# Patient Record
(demographics unavailable — no encounter records)

---

## 2024-10-30 NOTE — PHYSICAL EXAM
[Alert] : alert [Well Nourished] : well nourished [No Acute Distress] : no acute distress [Well Developed] : well developed [Normal Sclera/Conjunctiva] : normal sclera/conjunctiva [EOMI] : extra ocular movement intact [No Proptosis] : no proptosis [Normal Oropharynx] : the oropharynx was normal [Thyroid Not Enlarged] : the thyroid was not enlarged [No Thyroid Nodules] : no palpable thyroid nodules [No Respiratory Distress] : no respiratory distress [No Accessory Muscle Use] : no accessory muscle use [Clear to Auscultation] : lungs were clear to auscultation bilaterally [Normal S1, S2] : normal S1 and S2 [Normal Rate] : heart rate was normal [Regular Rhythm] : with a regular rhythm [No Edema] : no peripheral edema [Pedal Pulses Normal] : the pedal pulses are present [Normal Bowel Sounds] : normal bowel sounds [Not Tender] : non-tender [Not Distended] : not distended [Soft] : abdomen soft [Normal Anterior Cervical Nodes] : no anterior cervical lymphadenopathy [Normal Posterior Cervical Nodes] : no posterior cervical lymphadenopathy [No Spinal Tenderness] : no spinal tenderness [Spine Straight] : spine straight [Normal Gait] : normal gait [Normal Strength/Tone] : muscle strength and tone were normal [No Rash] : no rash [Acanthosis Nigricans] : acanthosis nigricans present [Right foot was examined, including] : right foot ~C was examined, including visual inspection with sensory and pulse exams [Left foot was examined, including] : left foot ~C was examined, including visual inspection with sensory and pulse exams [Normal] : normal [Full ROM] : with full range of motion [No Tremors] : no tremors [Oriented x3] : oriented to person, place, and time [Normal Affect] : the affect was normal [Normal Insight/Judgement] : insight and judgment were intact [Acne] : no acne [Diminished Throughout Both Feet] : normal tactile sensation with monofilament testing throughout both feet [de-identified] : cushingoid appearance, +dorsocervical fullness, acanthosis

## 2024-10-30 NOTE — HISTORY OF PRESENT ILLNESS
[FreeTextEntry1] : 36 yo F PMH of arthralgias without a defined aiCTD, steroid induced diabetes, elevated iGE, thyroid nodules here for endocrine care.  Fam hx of DM2 in parents. Had GDM at age 32, reports PCOS. Took metformin in high school.  Needed to be on insulin in pregnancy. Hx of preeclampsia.   A1c 5.9% today highest 6.8 on steroids in 11/2022  Not on any meds Metformin caused GI upset in high school. Prefers not to be on meds if possible.  Needs to see ophtho   Not checking sugars   Prednisone exposure for several years, not daily. Starting mrethlpred dose pack this week with meloxicam, does not know the dose. Seeing rheum and eNT  Was trying to walk 1 hour daily, 10k steps but dealing with back issues so exercise limited. Wearing a back brace now.  24 hr diet recall not hungry when she wakes up first meal at 2 pm - will eat larger portions, drink juice  8pm will have another meal but once she is starving and will binge   menstrual hx menarche: 10 yo , regular Has had hx of skipping menses; will get every 2-3 months previously  Took a while to get pregnant each time No ovulation med With exercise has been more regular last 4 months got menses monthly LMP 10/24/24 Potentially thinking about another pregnancy in the future  Seeing GYN Acne and hair thinning Not on OCP +skin tags   thyroid nodules  3/2023 US There is a 1 cm colloid cyst at the right upper pole. There is a 1.5 cm heterogeneous right midpole nodule. There is 6 mm colloid cyst right lower pole. A 1.6 cm complex cystic left upper pole nodule. A 8mm isoechoic left lower pole nodule. A 9 mm isoechoic left midpole nodule. Multiple small cervical lymph nodes are again noted. There is a reference 1.4 x 0.8 x 1.5 cm left submandibular lymph node. A reference 1.9 x 1.1 x 1.4 cm right level 2 node.

## 2024-10-30 NOTE — HISTORY OF PRESENT ILLNESS
[FreeTextEntry1] : 38 yo F PMH of arthralgias without a defined aiCTD, steroid induced diabetes, elevated iGE, thyroid nodules here for endocrine care.  Fam hx of DM2 in parents. Had GDM at age 32, reports PCOS. Took metformin in high school.  Needed to be on insulin in pregnancy. Hx of preeclampsia.   A1c 5.9% today highest 6.8 on steroids in 11/2022  Not on any meds Metformin caused GI upset in high school. Prefers not to be on meds if possible.  Needs to see ophtho   Not checking sugars   Prednisone exposure for several years, not daily. Starting mrethlpred dose pack this week with meloxicam, does not know the dose. Seeing rheum and eNT  Was trying to walk 1 hour daily, 10k steps but dealing with back issues so exercise limited. Wearing a back brace now.  24 hr diet recall not hungry when she wakes up first meal at 2 pm - will eat larger portions, drink juice  8pm will have another meal but once she is starving and will binge   menstrual hx menarche: 10 yo , regular Has had hx of skipping menses; will get every 2-3 months previously  Took a while to get pregnant each time No ovulation med With exercise has been more regular last 4 months got menses monthly LMP 10/24/24 Potentially thinking about another pregnancy in the future  Seeing GYN Acne and hair thinning Not on OCP +skin tags   thyroid nodules  3/2023 US There is a 1 cm colloid cyst at the right upper pole. There is a 1.5 cm heterogeneous right midpole nodule. There is 6 mm colloid cyst right lower pole. A 1.6 cm complex cystic left upper pole nodule. A 8mm isoechoic left lower pole nodule. A 9 mm isoechoic left midpole nodule. Multiple small cervical lymph nodes are again noted. There is a reference 1.4 x 0.8 x 1.5 cm left submandibular lymph node. A reference 1.9 x 1.1 x 1.4 cm right level 2 node.

## 2024-10-30 NOTE — ASSESSMENT
[Diabetes Foot Care] : diabetes foot care [Long Term Vascular Complications] : long term vascular complications of diabetes [Carbohydrate Consistent Diet] : carbohydrate consistent diet [Importance of Diet and Exercise] : importance of diet and exercise to improve glycemic control, achieve weight loss and improve cardiovascular health [Exercise/Effect on Glucose] : exercise/effect on glucose [Retinopathy Screening] : Patient was referred to ophthalmology for retinopathy screening [Weight Loss] : weight loss [Diabetic Medications] : Risks and benefits of diabetic medications were discussed [FreeTextEntry1] : 38 yo F PMH of arthralgias without a defined aiCTD, steroid induced diabetes, elevated iGE, thyroid nodules here for endocrine care.  #Steroid induced Diabetes Mellitus: goal HbA1c is <7%. Today HbA1c is 5.9. Medication changes recommended:  - no meds per pt preference. Discussed metformin while on steroids also with PCOS history reported - no glp1 now if not on contraception/planning pregnancy - pt will work on lifestyle modifications - discussed risks of chronic steroid exposure and DM2  Labs ordered: o   check CMP, lipids, B12, UMCR yearly.  o   check HbA1c every 3 months  Education/Counseling done during this visit: o   SMBG testing guidelines o   Medications reviewed o   Signs/Symptoms/Treatment of hypoglycemia o   Encouraged 30 min exercise five days a week, portion control, carb consistent diet. o   Recommended yearly foot exams and home foot precautions. Monofilament exam performed on 10/2024 normal o   Recommended at least yearly ophthalmology exams or as recommended by ophtho o   Preconception counseling completed for this patient of child-bearing age. *Pt may be considering pregnancy in the next year. Discussed need for glucose control to reduce fetal and maternal risk. Advised she consider metformin now. Needed insulin in last pregnancy for GDM   #HTN: Goal BP <130/80  not on meds; hx of preeclampsia. Sent bp cuff. Check renin/april -discussed weight loss -UMCR: check   #HLD:  - check lipis -Discussed healthy dieting, increased exercise and weight loss.  #thyroid nodules - check thyroid US  #irregular menses, reported PCOS, has acanthosis, HTN discussed lifestyle modifications to lose weight, improve BMI, glycemic control and hypertriglyceridemia check androgens, tfts. Defer PRL with recent regular menses. Do not have original labs on file discussed metformin menses now regular with some weight loss. Not on OCP. Monitor menstrual cycles off ocp no spironolactone not on OCP can try ovasitol   #cushingdoid appearance, weight gain, acanthosis, HTN - pt with chronic intermittent steroid exposure so likely iatrogenic - starting steroids now - no signs of AI - consider assessing HPA axis at future visit if off steroids

## 2024-10-30 NOTE — PHYSICAL EXAM
[Alert] : alert [Well Nourished] : well nourished [No Acute Distress] : no acute distress [Well Developed] : well developed [Normal Sclera/Conjunctiva] : normal sclera/conjunctiva [EOMI] : extra ocular movement intact [No Proptosis] : no proptosis [Normal Oropharynx] : the oropharynx was normal [Thyroid Not Enlarged] : the thyroid was not enlarged [No Thyroid Nodules] : no palpable thyroid nodules [No Respiratory Distress] : no respiratory distress [No Accessory Muscle Use] : no accessory muscle use [Clear to Auscultation] : lungs were clear to auscultation bilaterally [Normal S1, S2] : normal S1 and S2 [Normal Rate] : heart rate was normal [Regular Rhythm] : with a regular rhythm [No Edema] : no peripheral edema [Pedal Pulses Normal] : the pedal pulses are present [Normal Bowel Sounds] : normal bowel sounds [Not Tender] : non-tender [Not Distended] : not distended [Soft] : abdomen soft [Normal Anterior Cervical Nodes] : no anterior cervical lymphadenopathy [Normal Posterior Cervical Nodes] : no posterior cervical lymphadenopathy [No Spinal Tenderness] : no spinal tenderness [Spine Straight] : spine straight [Normal Gait] : normal gait [Normal Strength/Tone] : muscle strength and tone were normal [No Rash] : no rash [Acanthosis Nigricans] : acanthosis nigricans present [Right foot was examined, including] : right foot ~C was examined, including visual inspection with sensory and pulse exams [Left foot was examined, including] : left foot ~C was examined, including visual inspection with sensory and pulse exams [Normal] : normal [Full ROM] : with full range of motion [No Tremors] : no tremors [Oriented x3] : oriented to person, place, and time [Normal Affect] : the affect was normal [Normal Insight/Judgement] : insight and judgment were intact [Acne] : no acne [Diminished Throughout Both Feet] : normal tactile sensation with monofilament testing throughout both feet [de-identified] : cushingoid appearance, +dorsocervical fullness, acanthosis

## 2024-10-30 NOTE — ASSESSMENT
[Diabetes Foot Care] : diabetes foot care [Long Term Vascular Complications] : long term vascular complications of diabetes [Carbohydrate Consistent Diet] : carbohydrate consistent diet [Importance of Diet and Exercise] : importance of diet and exercise to improve glycemic control, achieve weight loss and improve cardiovascular health [Exercise/Effect on Glucose] : exercise/effect on glucose [Retinopathy Screening] : Patient was referred to ophthalmology for retinopathy screening [Weight Loss] : weight loss [Diabetic Medications] : Risks and benefits of diabetic medications were discussed [FreeTextEntry1] : 36 yo F PMH of arthralgias without a defined aiCTD, steroid induced diabetes, elevated iGE, thyroid nodules here for endocrine care.  #Steroid induced Diabetes Mellitus: goal HbA1c is <7%. Today HbA1c is 5.9. Medication changes recommended:  - no meds per pt preference. Discussed metformin while on steroids also with PCOS history reported - no glp1 now if not on contraception/planning pregnancy - pt will work on lifestyle modifications - discussed risks of chronic steroid exposure and DM2  Labs ordered: o   check CMP, lipids, B12, UMCR yearly.  o   check HbA1c every 3 months  Education/Counseling done during this visit: o   SMBG testing guidelines o   Medications reviewed o   Signs/Symptoms/Treatment of hypoglycemia o   Encouraged 30 min exercise five days a week, portion control, carb consistent diet. o   Recommended yearly foot exams and home foot precautions. Monofilament exam performed on 10/2024 normal o   Recommended at least yearly ophthalmology exams or as recommended by ophtho o   Preconception counseling completed for this patient of child-bearing age. *Pt may be considering pregnancy in the next year. Discussed need for glucose control to reduce fetal and maternal risk. Advised she consider metformin now. Needed insulin in last pregnancy for GDM   #HTN: Goal BP <130/80  not on meds; hx of preeclampsia. Sent bp cuff. Check renin/april -discussed weight loss -UMCR: check   #HLD:  - check lipis -Discussed healthy dieting, increased exercise and weight loss.  #thyroid nodules - check thyroid US  #irregular menses, reported PCOS, has acanthosis, HTN discussed lifestyle modifications to lose weight, improve BMI, glycemic control and hypertriglyceridemia check androgens, tfts. Defer PRL with recent regular menses. Do not have original labs on file discussed metformin menses now regular with some weight loss. Not on OCP. Monitor menstrual cycles off ocp no spironolactone not on OCP can try ovasitol   #cushingdoid appearance, weight gain, acanthosis, HTN - pt with chronic intermittent steroid exposure so likely iatrogenic - starting steroids now - no signs of AI - consider assessing HPA axis at future visit if off steroids

## 2024-11-12 NOTE — REASON FOR VISIT
[Subsequent Evaluation] : a subsequent evaluation for [FreeTextEntry2] : s/p left tympanoplasty with a composite cartilage graft and microscope use 8/30/22

## 2024-11-12 NOTE — HISTORY OF PRESENT ILLNESS
[de-identified] : 37 year old woman here for left otorrhea hx of left tympanoplasty with a composite cartilage graft and microscope use 8/30/22.  Has had left otorrhea thats brown and wet, and sometimes bright red blood  Discomfort to the left ear Intermittent tinnitus Patient denies dizziness, vertigo, headaches related to hearing.

## 2024-11-12 NOTE — PHYSICAL EXAM
[Midline] : trachea located in midline position [Binocular Microscopic Exam] : Binocular microscopic exam was performed [Hearing Benavides Test (Tuning Fork On Forehead)] : no lateralization of tone [Normal] : gait was normal [de-identified] : large L lymphadenopathy, tender [Hearing Loss Right Only] : normal [Hearing Loss Left Only] : normal [Rinne Test Air Conduction Persists > Bone Conduction Right] : bone conduction greater than air conduction on the right [Rinne Test Air Conduction Persists > Bone Conduction Left] : bone conduction greater than air conduction on the left [Nystagmus] : ~T no ~M nystagmus was seen [Fukuda Step Test] : Fukuda Step Test was Negative [Romberg's Sign] : Romberg's sign was absent [Fistula Sign] : Fistula Sign: Negative [Past-Pointing] : Past-Pointing: Negative [Vinod-Hallkevinke] : Medina-Hallpike: Negative [FreeTextEntry9] : dry [de-identified] : small polyp, cauterized

## 2024-11-12 NOTE — PROCEDURE
[Same] : same as the Pre Op Dx. [] : Binocular Microscopy [FreeTextEntry1] : tympanoplasty  [FreeTextEntry4] : none [FreeTextEntry6] : Operative microscope was used to examine the ear canal, ear drum, and visible middle ear landmarks. Adequate exam would not have been possible without the use of a microscope. Findings are described.

## 2024-11-14 NOTE — ASSESSMENT
[FreeTextEntry1] : .  Ms. 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 no muscle weakness -- currently drinking 8 - 10 glasses of fluid daily -- myomarker September 2022 with borderline PM/Scl ab - no s/s of scl -- MRI thigh June 8 no muscle edema or atrophy - had some tendionsis -- will aquire test for non-inflammatory metabolic myopathies as this is the second episode of cramping and muscle pain associated with increased cpk. first time after anesthesia with full rhabdo and now second smaller episode. resolved without steroids.- d/w patient ta she will be getting the kit shortly -- CPK now normal -- can now do the metabolic myoapthy lab testing at  - will request send through [facility]  #UCTD extensive workup for LAD (see below) and elevated IgE, with negative serologies but elevated inflammatory markers. Had extensive AI and ID workup with several biopsies - all non-conclusive. steroid trial helped and currently has very little pain functions well -- d/w patient HCQ in place of steroids for polyarthralgia inc esr - declines for now r/b/a discussed -- just had steroids for the back - which seems unrelated - will check ESR. CRP to see if it improved on susan steroids  #LAD. cervical - level I/II and axillary. unclear etiology. s/p excision and as d/w pathologist not c/w IGG4, nor lymphoma. Heme/onc, ADALI an Rheum workup has at this point been negative. rheum workup at this point has been unrevealing and no s/s autoimmune etiology aside from non-specific elevation of inflammatory markers. workup thus far negative for aiCTD by serologies, sarcoid (PET, ACE not helpful), IGg4 ( neg bx), kikuchi (lacks the constitutional sxs) -- reviewed pathology with patient and the pathologist - negative -- reviewed heme onc evaluation - negative -- f/u at least annually  #elevated IgE unclear etiology as doesn't have features of other Hyper IgE syndromes.- out of proportion to h/o of asthma (which was active as a kid) - currently no lung, skin abscesses and normal eosinophils in the blood. normal eosinophils speaks against hyper IgE. no other signs of EGPA and ANCA negative, - r/o infection with inc percent eo and igE - was checked for cat scratch, tb etc - f/u Oppenheimer was negative -- f/u onc annually  #medication monitoring, long term use of drug -- will check labs in a month  # low vitamin D -- supplement to keep above 32   More than 50% of the encounter was spent counseling the patient on differential, workup, disease course and treatment/management. Education was provided to the patient during this encounter. All questions and concerns were addressed and answered. The patient verbalized understanding and agreed to the plan.  Patient has been instructed to call for an appointment if new symptoms develop. Patient has been instructed to make a followup appointment 2 - 3 months  Time spent on the encounter included, but is not limited to, preparing to see the patient, obtaining and/or reviewing separately obtained history, performing the evaluation, counseling and educating, independently interpreting results with communication to patient, order placement, referring and/or communicating with other health professionals as described, and documenting clinical information in the electronic health record.

## 2024-11-14 NOTE — ASSESSMENT
[FreeTextEntry1] : .  Ms. 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 no muscle weakness -- currently drinking 8 - 10 glasses of fluid daily -- myomarker September 2022 with borderline PM/Scl ab - no s/s of scl -- MRI thigh June 8 no muscle edema or atrophy - had some tendionsis -- will aquire test for non-inflammatory metabolic myopathies as this is the second episode of cramping and muscle pain associated with increased cpk. first time after anesthesia with full rhabdo and now second smaller episode. resolved without steroids.- d/w patient ta she will be getting the kit shortly -- CPK now normal -- can now do the metabolic myoapthy lab testing at  - will request send through [facility]  #UCTD extensive workup for LAD (see below) and elevated IgE, with negative serologies but elevated inflammatory markers. Had extensive AI and ID workup with several biopsies - all non-conclusive. steroid trial helped and currently has very little pain functions well -- d/w patient HCQ in place of steroids for polyarthralgia inc esr - declines for now r/b/a discussed -- just had steroids for the back - which seems unrelated - will check ESR. CRP to see if it improved on susan steroids  #LAD. cervical - level I/II and axillary. unclear etiology. s/p excision and as d/w pathologist not c/w IGG4, nor lymphoma. Heme/onc, ADALI an Rheum workup has at this point been negative. rheum workup at this point has been unrevealing and no s/s autoimmune etiology aside from non-specific elevation of inflammatory markers. workup thus far negative for aiCTD by serologies, sarcoid (PET, ACE not helpful), IGg4 ( neg bx), kikuchi (lacks the constitutional sxs) -- reviewed pathology with patient and the pathologist - negative -- reviewed heme onc evaluation - negative -- f/u at least annually  #elevated IgE unclear etiology as doesn't have features of other Hyper IgE syndromes.- out of proportion to h/o of asthma (which was active as a kid) - currently no lung, skin abscesses and normal eosinophils in the blood. normal eosinophils speaks against hyper IgE. no other signs of EGPA and ANCA negative, - r/o infection with inc percent eo and igE - was checked for cat scratch, tb etc - f/u Oppenheimer was negative -- f/u onc annually  #medication monitoring, long term use of drug -- will check labs in a month  # low vitamin D -- supplement to keep above 32   More than 50% of the encounter was spent counseling the patient on differential, workup, disease course and treatment/management. Education was provided to the patient during this encounter. All questions and concerns were addressed and answered. The patient verbalized understanding and agreed to the plan.  Patient has been instructed to call for an appointment if new symptoms develop. Patient has been instructed to make a followup appointment 2 - 3 months  Time spent on the encounter included, but is not limited to, preparing to see the patient, obtaining and/or reviewing separately obtained history, performing the evaluation, counseling and educating, independently interpreting results with communication to patient, order placement, referring and/or communicating with other health professionals as described, and documenting clinical information in the electronic health record.

## 2024-11-14 NOTE — HISTORY OF PRESENT ILLNESS
[FreeTextEntry1] :  Ms. Montilla has been seen in our office for persistent LAD, increased IgE and worsening arthralgias without a defined aiCTD    INTERVAL Hx  was doing quite well - never started the medication as felt great over the summer  hands are so much better than they were  howevere was developing more lower back pain - no radiation or neurologic challenges (no bowel or bladder changes)  no rash  has persistence of lymph nodes  was given soem steroids - and felt better in the back ----------------------------------------------------- presenting hx / background august 2020 - LAD without associated systemic complaints - no fevers, rash, night sweats, sick contacts.- workup including excisional bx negative except for markedly elevated IgE without clear etiology

## 2024-12-11 NOTE — PLAN
[FreeTextEntry1] : 37 year old  LMP 11/24/24 presents for annual gyn visit.  HPV/Pap done today RTO one year for annual

## 2024-12-11 NOTE — HISTORY OF PRESENT ILLNESS
[Patient reported mammogram was normal] : Patient reported mammogram was normal [Patient reported breast sonogram was normal] : Patient reported breast sonogram was normal [Patient reported PAP Smear was normal] : Patient reported PAP Smear was normal [No] : Patient does not have concerns regarding sex [Currently Active] : currently active [Men] : men [Condoms] : Condoms [FreeTextEntry1] : 37 year old  LMP 11/24/24 presents for annual gyn visit.  Pt feels well and has no complaints. She denies intermenstrual bleeding, itching, burning or abnormal discharge.  [Mammogramdate] : 01/24 [BreastSonogramDate] : 01/24 [PapSmeardate] : 10/22 [FreeTextEntry2] : one partner

## 2024-12-11 NOTE — SIGNATURES
[TextEntry] : This note was written by Lea Wetzel on 12/10/2024 actively solely BILLIE Clarke M.D 12/10/2024. All medical record entries made by this scribe were at my  BILLIE Clarke M.D  direction and personally dictated by me on 12/10/2024. I have personally reviewed my chart and agree that the record reflects my personal performance of the history, physical exam, assessment, and plan.

## 2024-12-11 NOTE — PHYSICAL EXAM
[Chaperone Present] : A chaperone was present in the examining room during all aspects of the physical examination [Appropriately responsive] : appropriately responsive [Alert] : alert [No Acute Distress] : no acute distress [No Lymphadenopathy] : no lymphadenopathy [Soft] : soft [Non-tender] : non-tender [Non-distended] : non-distended [No HSM] : No HSM [No Lesions] : no lesions [No Mass] : no mass [Oriented x3] : oriented x3 [Examination Of The Breasts] : a normal appearance [No Masses] : no breast masses were palpable [Labia Majora] : normal [Labia Minora] : normal [Normal] : normal [Uterine Adnexae] : normal [57033] : A chaperone was present during the pelvic exam. [FreeTextEntry2] : MARQUEZ Bradley

## 2024-12-11 NOTE — PHYSICAL EXAM
[Chaperone Present] : A chaperone was present in the examining room during all aspects of the physical examination [Appropriately responsive] : appropriately responsive [Alert] : alert [No Acute Distress] : no acute distress [No Lymphadenopathy] : no lymphadenopathy [Soft] : soft [Non-tender] : non-tender [Non-distended] : non-distended [No HSM] : No HSM [No Lesions] : no lesions [No Mass] : no mass [Oriented x3] : oriented x3 [Examination Of The Breasts] : a normal appearance [No Masses] : no breast masses were palpable [Labia Majora] : normal [Labia Minora] : normal [Normal] : normal [Uterine Adnexae] : normal [54611] : A chaperone was present during the pelvic exam. [FreeTextEntry2] : MARQUEZ Bradley

## 2024-12-19 NOTE — ASSESSMENT
[FreeTextEntry1] : #Lip ulcer, favor HSV, resolving  - f/u HSV/VZV PCR  - f/u wound culture to rule out impetigo  - START valacyclovir 2g BID for 1 day per preference, SED. Refills sent.   #Nasal ulcers, not active today, new diagnosis with uncertain prognosis  Favor HSV/VZV  - trial valtrex with next breakout - sent mupirocin as requested as hx of impetigo  RTC as needed

## 2024-12-19 NOTE — HISTORY OF PRESENT ILLNESS
[FreeTextEntry1] : Lesion on lip [de-identified] : SEDRICK MCCLELLAND is a 37 year old female presenting for:  1. Painful lesion on the upper lip for the past 3-4 days. Felt like her impetigo has felt previously, so she used mupirocin on it for the past several days. Has a history of HSV1, with her most recent outbreak last month for which she took valacyclovir. She has less than 5 episodes of HSV 1 yearly. She reports she also has a history of painful nasal ulcers.

## 2025-03-14 NOTE — ASSESSMENT
[FreeTextEntry1] : .  Ms. Montilla is a woman PMHX Lyme (treated doxy) now with LAD and arthralgias   complicated patient with IgE elevation and LAD - now chronic and without dx  # vision changes  -- subtle changes but at times double  -- optho evaluation normal  -- rec neuro evaluation  # back pain  didnt sound inflammatory -- now imrpoved after steroid pack  -- no neurologic sequela  -- going for extended trip overseeas - will send a MDP as an emergency  # increased CPK previously with rhabdo but this was after anesthesia. no muscle weakness -- currently drinking 8 - 10 glasses of fluid daily -- myomarker September 2022 with borderline PM/Scl ab - no s/s of scl -- MRI thigh June 8 no muscle edema or atrophy - had some tendionsis -- will aquire test for non-inflammatory metabolic myopathies as this is the second episode of cramping and muscle pain associated with increased cpk. first time after anesthesia with full rhabdo and now second smaller episode. resolved without steroids.- d/w patient taht she will be getting the kit shortly -- CPK now normal -- can now do the metabolic myoapthy lab testing at  - will request send through [employer]  #UCTD extensive workup for LAD (see below) and elevated IgE, with negative serologies but elevated inflammatory markers. Had extensive AI and ID workup with several biopsies - all non-conclusive. steroid trial helped and currently has very little pain functions well -- d/w patient HCQ in place of steroids for polyarthralgia inc esr - declines for now r/b/a discussed -- just had steroids for the back - which seems unrelated - will check ESR. CRP to see if it improved on susan steroids  #LAD. cervical - level I/II and axillary. unclear etiology. s/p excision and as d/w pathologist not c/w IGG4, nor lymphoma. Heme/onc, ADALI an Rheum workup has at this point been negative. rheum workup at this point has been unrevealing and no s/s autoimmune etiology aside from non-specific elevation of inflammatory markers. workup thus far negative for aiCTD by serologies, sarcoid (PET, ACE not helpful), IGg4 ( neg bx), kikuchi (lacks the constitutional sxs) -- reviewed pathology with patient and the pathologist - negative -- reviewed heme onc evaluation - negative -- f/u at least annually  #elevated IgE unclear etiology as doesn't have features of other Hyper IgE syndromes.- out of proportion to h/o of asthma (which was active as a kid) - currently no lung, skin abscesses and normal eosinophils in the blood. normal eosinophils speaks against hyper IgE. no other signs of EGPA and ANCA negative, - r/o infection with inc percent eo and igE - was checked for cat scratch, tb etc - f/u Oppenheimer was negative -- f/u onc annually  #medication monitoring, long term use of drug -- will check labs in a month  # low vitamin D -- supplement to keep above 32   More than 50% of the encounter was spent counseling the patient on differential, workup, disease course and treatment/management. Education was provided to the patient during this encounter. All questions and concerns were addressed and answered. The patient verbalized understanding and agreed to the plan.  Patient has been instructed to call for an appointment if new symptoms develop. Patient has been instructed to make a followup appointment 2 - 3 months  Time spent on the encounter included, but is not limited to, preparing to see the patient, obtaining and/or reviewing separately obtained history, performing the evaluation, counseling and educating, independently interpreting results with communication to patient, order placement, referring and/or communicating with other health professionals as described, and documenting clinical information in the electronic health record.

## 2025-03-14 NOTE — HISTORY OF PRESENT ILLNESS
[FreeTextEntry1] :  This visit was provided via telehealth using real-time 2-way audio-visual technology.    The patient was located at HOME in [place] at the time of the visit.   The provider was located at HOME in [place] at the time of the visit.   The patient and provider participated in the telehealth encounter.   Verbal consent for telehealth services was given by the patient.  Ms. Montilla has been seen in our office for persistent LAD, increased IgE and worsening arthralgias without a defined aiCTD    INTERVAL Hx  in terms of the joints  - doing well - has been fasting and feels liek this helps  - lymph nodes never gone down - still persistent  - no joint pain or swelling   in terms of the IgE - daughter has very high IgE - they checked it in pediatrics with allergy  - asymptomatic   in terms of vision  - at times eyes get tired - sees linear - blink and gets better - soemtimes sees double - loses track of the focus - feels more muscular - as she is driving eg  - wetting dropping don't help - feels the eyes shift and look focus - will see neuro    ----------------------------------------------------- presenting hx / background august 2020 - LAD without associated systemic complaints - no fevers, rash, night sweats, sick contacts.- workup including excisional bx negative except for markedly elevated IgE without clear etiology

## 2025-03-14 NOTE — ASSESSMENT
[FreeTextEntry1] : .  Ms. Montilla is a woman PMHX Lyme (treated doxy) now with LAD and arthralgias   complicated patient with IgE elevation and LAD - now chronic and without dx  # vision changes  -- subtle changes but at times double  -- optho evaluation normal  -- rec neuro evaluation  # back pain  didnt sound inflammatory -- now imrpoved after steroid pack  -- no neurologic sequela  -- going for extended trip overseeas - will send a MDP as an emergency  # increased CPK previously with rhabdo but this was after anesthesia. no muscle weakness -- currently drinking 8 - 10 glasses of fluid daily -- myomarker September 2022 with borderline PM/Scl ab - no s/s of scl -- MRI thigh June 8 no muscle edema or atrophy - had some tendionsis -- will aquire test for non-inflammatory metabolic myopathies as this is the second episode of cramping and muscle pain associated with increased cpk. first time after anesthesia with full rhabdo and now second smaller episode. resolved without steroids.- d/w patient taht she will be getting the kit shortly -- CPK now normal -- can now do the metabolic myoapthy lab testing at  - will request send through [employer]  #UCTD extensive workup for LAD (see below) and elevated IgE, with negative serologies but elevated inflammatory markers. Had extensive AI and ID workup with several biopsies - all non-conclusive. steroid trial helped and currently has very little pain functions well -- d/w patient HCQ in place of steroids for polyarthralgia inc esr - declines for now r/b/a discussed -- just had steroids for the back - which seems unrelated - will check ESR. CRP to see if it improved on susan steroids  #LAD. cervical - level I/II and axillary. unclear etiology. s/p excision and as d/w pathologist not c/w IGG4, nor lymphoma. Heme/onc, ADALI an Rheum workup has at this point been negative. rheum workup at this point has been unrevealing and no s/s autoimmune etiology aside from non-specific elevation of inflammatory markers. workup thus far negative for aiCTD by serologies, sarcoid (PET, ACE not helpful), IGg4 ( neg bx), kikuchi (lacks the constitutional sxs) -- reviewed pathology with patient and the pathologist - negative -- reviewed heme onc evaluation - negative -- f/u at least annually  #elevated IgE unclear etiology as doesn't have features of other Hyper IgE syndromes.- out of proportion to h/o of asthma (which was active as a kid) - currently no lung, skin abscesses and normal eosinophils in the blood. normal eosinophils speaks against hyper IgE. no other signs of EGPA and ANCA negative, - r/o infection with inc percent eo and igE - was checked for cat scratch, tb etc - f/u Oppenheimer was negative -- f/u onc annually  #medication monitoring, long term use of drug -- will check labs in a month  # low vitamin D -- supplement to keep above 32   More than 50% of the encounter was spent counseling the patient on differential, workup, disease course and treatment/management. Education was provided to the patient during this encounter. All questions and concerns were addressed and answered. The patient verbalized understanding and agreed to the plan.  Patient has been instructed to call for an appointment if new symptoms develop. Patient has been instructed to make a followup appointment 2 - 3 months  Time spent on the encounter included, but is not limited to, preparing to see the patient, obtaining and/or reviewing separately obtained history, performing the evaluation, counseling and educating, independently interpreting results with communication to patient, order placement, referring and/or communicating with other health professionals as described, and documenting clinical information in the electronic health record.

## 2025-03-30 NOTE — HISTORY OF PRESENT ILLNESS
[FreeTextEntry1] : cpe/est care prior pcp Dr. Echeverria  [de-identified] : SEDRICK MCCLELLAND is a 37 year F who presents for CPE/est care PMHx undifferentiated connective tissue disease, HTN, HLD, asthma, allergic rhinitis, PCOS, DM2, hepatic steatosis, irregular menses, hx lymphadenopathy s/p negative workup Feels well overall  Rheum Dr. Freeman Cardio Dr. Hou Gyn Dr. Nicholson ENT Dr. Lorna Morales  Ophthalmology Dr. Sherman

## 2025-03-30 NOTE — HEALTH RISK ASSESSMENT
[No] : In the past 12 months have you used drugs other than those required for medical reasons? No [0] : 2) Feeling down, depressed, or hopeless: Not at all (0) [PHQ-2 Negative - No further assessment needed] : PHQ-2 Negative - No further assessment needed [] :  [# Of Children ___] : has [unfilled] children [Feels Safe at Home] : Feels safe at home [Fully functional (bathing, dressing, toileting, transferring, walking, feeding)] : Fully functional (bathing, dressing, toileting, transferring, walking, feeding) [Fully functional (using the telephone, shopping, preparing meals, housekeeping, doing laundry, using] : Fully functional and needs no help or supervision to perform IADLs (using the telephone, shopping, preparing meals, housekeeping, doing laundry, using transportation, managing medications and managing finances) [Never] : Never [AHH1Hbqxy] : 0 [MammogramDate] : 01/24 [MammogramComments] : w/ US [PapSmearDate] : 12/24 [de-identified] : , 2 kids; mom, dad brother upstairs  [FreeTextEntry2] : stay at home mom

## 2025-03-30 NOTE — HISTORY OF PRESENT ILLNESS
[FreeTextEntry1] : cpe/est care prior pcp Dr. Echeverria  [de-identified] : SEDRICK MCCLELLAND is a 37 year F who presents for CPE/est care PMHx undifferentiated connective tissue disease, HTN, HLD, asthma, allergic rhinitis, PCOS, DM2, hepatic steatosis, irregular menses, hx lymphadenopathy s/p negative workup Feels well overall  Rheum Dr. Freeman Cardio Dr. Hou Gyn Dr. Nicholson ENT Dr. Lorna Morales  Ophthalmology Dr. Sherman

## 2025-03-30 NOTE — ASSESSMENT
[FreeTextEntry1] : Health Care Maintenance - well visit - recent labs 3/2025 with rheum Dr. Martinez, remainder of routine labs ordered - depression screen negative - pap smear 12/2024 - mammogram/US due, ordered, hx of calcifications and small intramammary lymph node - flu vaccine- recommend annually - covid vaccines- utd - tdap 9/219 - advised to get annual eye exams with optometry/ophthalmology, skin exams with dermatology, and dental exams - RTC for CPE in 1 year or sooner prn  DM2 -diet controlled -follows with endo  -advised to see ophthalmology and podiatry annually  HTN -bp controlled -c/w coreg -follows with cardio Dr. Hou  HLRUSS -check lipid panel  Undifferentiated connective tissue disease -not on any medications at this time  -follows with elham Martinez   Thyroid nodule -US 11/2024- stable, TI-RAD 1  Hx of axillary and cervical lymphadenopathy -s/p cervical and axillary LN biopsies- benign, thought to be reactive/autoimmune -US breast ordered -US thyroid 11/2024 shows stable cervical LNs -workup by rheum and heme/onc unrevealing   Hx of L TM perf s/p tympanoplasty -follows with ENT Dr. Rothman   RTC in 6 months

## 2025-03-30 NOTE — HEALTH RISK ASSESSMENT
[No] : In the past 12 months have you used drugs other than those required for medical reasons? No [0] : 2) Feeling down, depressed, or hopeless: Not at all (0) [PHQ-2 Negative - No further assessment needed] : PHQ-2 Negative - No further assessment needed [] :  [# Of Children ___] : has [unfilled] children [Feels Safe at Home] : Feels safe at home [Fully functional (bathing, dressing, toileting, transferring, walking, feeding)] : Fully functional (bathing, dressing, toileting, transferring, walking, feeding) [Fully functional (using the telephone, shopping, preparing meals, housekeeping, doing laundry, using] : Fully functional and needs no help or supervision to perform IADLs (using the telephone, shopping, preparing meals, housekeeping, doing laundry, using transportation, managing medications and managing finances) [Never] : Never [[id number]] : 0 [MammogramDate] : 01/24 [MammogramComments] : w/ US [PapSmearDate] : 12/24 [de-identified] : , 2 kids; mom, dad brother upstairs  [FreeTextEntry2] : stay at home mom

## 2025-03-30 NOTE — PHYSICAL EXAM
[No Acute Distress] : no acute distress [Well-Appearing] : well-appearing [Normal Sclera/Conjunctiva] : normal sclera/conjunctiva [EOMI] : extraocular movements intact [No Lymphadenopathy] : no lymphadenopathy [Supple] : supple [No Respiratory Distress] : no respiratory distress  [No Accessory Muscle Use] : no accessory muscle use [Clear to Auscultation] : lungs were clear to auscultation bilaterally [Normal Rate] : normal rate  [Regular Rhythm] : with a regular rhythm [Normal S1, S2] : normal S1 and S2 [No Edema] : there was no peripheral edema [No Extremity Clubbing/Cyanosis] : no extremity clubbing/cyanosis [Soft] : abdomen soft [Non Tender] : non-tender [Non-distended] : non-distended [Normal Bowel Sounds] : normal bowel sounds [Normal Affect] : the affect was normal [Normal Insight/Judgement] : insight and judgment were intact [de-identified] : R foot, 1st toenail hyperpigmentation

## 2025-03-30 NOTE — PHYSICAL EXAM
[No Acute Distress] : no acute distress [Well-Appearing] : well-appearing [Normal Sclera/Conjunctiva] : normal sclera/conjunctiva [EOMI] : extraocular movements intact [No Lymphadenopathy] : no lymphadenopathy [Supple] : supple [No Respiratory Distress] : no respiratory distress  [No Accessory Muscle Use] : no accessory muscle use [Clear to Auscultation] : lungs were clear to auscultation bilaterally [Normal Rate] : normal rate  [Regular Rhythm] : with a regular rhythm [Normal S1, S2] : normal S1 and S2 [No Edema] : there was no peripheral edema [No Extremity Clubbing/Cyanosis] : no extremity clubbing/cyanosis [Soft] : abdomen soft [Non Tender] : non-tender [Non-distended] : non-distended [Normal Bowel Sounds] : normal bowel sounds [Normal Affect] : the affect was normal [Normal Insight/Judgement] : insight and judgment were intact [de-identified] : R foot, 1st toenail hyperpigmentation

## 2025-07-14 NOTE — PHYSICAL EXAM
[General Appearance - Alert] : alert [General Appearance - In No Acute Distress] : in no acute distress [Oriented To Time, Place, And Person] : oriented to person, place, and time [Impaired Insight] : insight and judgment were intact [Affect] : the affect was normal normal (ped)...

## 2025-07-15 NOTE — ASSESSMENT
[FreeTextEntry1] : .  Ms. Montilla is a woman PMHX Lyme (treated doxy) now with LAD and arthralgias   complicated patient with IgE elevation and LAD - now chronic and without dx  # increased CPK previously with rhabdo but this was after anesthesia. 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 Had extensive AI and ID workup with several biopsies - all non-conclusive. steroid trial helped and currently has very little pain functions well -- d/w patient HCQ in place of steroids for polyarthralgia inc esr - declines for now r/b/a discussed - readdressed today -- will accept steroids for this flare but is not ready to commit to another long term daily medicaiton   #LAD. cervical - level I/II and axillary. unclear etiology. s/p excision and as d/w pathologist not c/w IGG4, nor lymphoma. Heme/onc, ADALI an Rheum workup has at this point been negative. rheum workup at this point has been unrevealing and no s/s autoimmune etiology aside from non-specific elevation of inflammatory markers. workup thus far negative for aiCTD by serologies, sarcoid (PET, ACE not helpful), IGg4 ( neg bx), kikuchi (lacks the constitutional sxs) -- reviewed pathology with patient and the pathologist - negative -- reviewed heme onc evaluation - negative -- f/u at least annually  #elevated IgE unclear etiology as doesn't have features of other Hyper IgE syndromes.- out of proportion to h/o of asthma (which was active as a kid) - currently no lung, skin abscesses and normal eosinophils in the blood. normal eosinophils speaks against hyper IgE. no other signs of EGPA and ANCA negative, - r/o infection with inc percent eo and igE - was checked for cat scratch, tb etc - f/u Oppenheimer was negative -- f/u onc annually  #medication monitoring, long term use of drug -- will check labs in a month  # low vitamin D -- supplement to keep above 32   ============= More than 50% of the encounter was spent counseling the patient on differential, workup, disease course and treatment/management. Education was provided to the patient during this encounter. All questions and concerns were addressed and answered. The patient verbalized understanding and agreed to the plan.  Patient has been instructed to call for an appointment if new symptoms develop. Patient has been instructed to make a followup appointment 2 - 3 months  Time spent on the encounter included, but is not limited to, preparing to see the patient, obtaining and/or reviewing separately obtained history, performing the evaluation, counseling and educating, independently interpreting results with communication to patient, order placement, referring and/or communicating with other health professionals as described, and documenting clinical information in the electronic health record.

## 2025-07-15 NOTE — ASSESSMENT
[FreeTextEntry1] : .  Ms. Mnotilla is a woman PMHX Lyme (treated doxy) now with LAD and arthralgias   complicated patient with IgE elevation and LAD - now chronic and without dx  # increased CPK previously with rhabdo but this was after anesthesia. 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 Had extensive AI and ID workup with several biopsies - all non-conclusive. steroid trial helped and currently has very little pain functions well -- d/w patient HCQ in place of steroids for polyarthralgia inc esr - declines for now r/b/a discussed - readdressed today -- will accept steroids for this flare but is not ready to commit to another long term daily medicaiton   #LAD. cervical - level I/II and axillary. unclear etiology. s/p excision and as d/w pathologist not c/w IGG4, nor lymphoma. Heme/onc, ADALI an Rheum workup has at this point been negative. rheum workup at this point has been unrevealing and no s/s autoimmune etiology aside from non-specific elevation of inflammatory markers. workup thus far negative for aiCTD by serologies, sarcoid (PET, ACE not helpful), IGg4 ( neg bx), kikuchi (lacks the constitutional sxs) -- reviewed pathology with patient and the pathologist - negative -- reviewed heme onc evaluation - negative -- f/u at least annually  #elevated IgE unclear etiology as doesn't have features of other Hyper IgE syndromes.- out of proportion to h/o of asthma (which was active as a kid) - currently no lung, skin abscesses and normal eosinophils in the blood. normal eosinophils speaks against hyper IgE. no other signs of EGPA and ANCA negative, - r/o infection with inc percent eo and igE - was checked for cat scratch, tb etc - f/u Oppenheimer was negative -- f/u onc annually  #medication monitoring, long term use of drug -- will check labs in a month  # low vitamin D -- supplement to keep above 32   ============= More than 50% of the encounter was spent counseling the patient on differential, workup, disease course and treatment/management. Education was provided to the patient during this encounter. All questions and concerns were addressed and answered. The patient verbalized understanding and agreed to the plan.  Patient has been instructed to call for an appointment if new symptoms develop. Patient has been instructed to make a followup appointment 2 - 3 months  Time spent on the encounter included, but is not limited to, preparing to see the patient, obtaining and/or reviewing separately obtained history, performing the evaluation, counseling and educating, independently interpreting results with communication to patient, order placement, referring and/or communicating with other health professionals as described, and documenting clinical information in the electronic health record.

## 2025-07-15 NOTE — HISTORY OF PRESENT ILLNESS
[FreeTextEntry1] : Ms. Montilla has been seen in our office for persistent LAD, increased IgE and worsening arthralgias without a defined aiCTD   INTERVAL Hx  feels like she is in a flare  - has AMS for hours - c/o paini n her hands and toes  - occasionally gets rashes likely bumps and itch and not today  - does not really want meds still though   in terms of the IgE - daughter has very high IgE - they checked it in pediatrics with allergy  - asymptomatic    ----------------------------------------------------- presenting hx / background august 2020 - LAD without associated systemic complaints - no fevers, rash, night sweats, sick contacts.- workup including excisional bx negative except for markedly elevated IgE without clear etiology    
[FreeTextEntry1] : Ms. Montilla has been seen in our office for persistent LAD, increased IgE and worsening arthralgias without a defined aiCTD   INTERVAL Hx  feels like she is in a flare  - has AMS for hours - c/o paini n her hands and toes  - occasionally gets rashes likely bumps and itch and not today  - does not really want meds still though   in terms of the IgE - daughter has very high IgE - they checked it in pediatrics with allergy  - asymptomatic    ----------------------------------------------------- presenting hx / background august 2020 - LAD without associated systemic complaints - no fevers, rash, night sweats, sick contacts.- workup including excisional bx negative except for markedly elevated IgE without clear etiology    
no